# Patient Record
Sex: MALE | Race: WHITE | NOT HISPANIC OR LATINO | Employment: FULL TIME | URBAN - METROPOLITAN AREA
[De-identification: names, ages, dates, MRNs, and addresses within clinical notes are randomized per-mention and may not be internally consistent; named-entity substitution may affect disease eponyms.]

---

## 2017-04-13 ENCOUNTER — HOSPITAL ENCOUNTER (EMERGENCY)
Facility: HOSPITAL | Age: 27
Discharge: HOME | End: 2017-04-13
Attending: EMERGENCY MEDICINE
Payer: COMMERCIAL

## 2017-04-13 VITALS
WEIGHT: 175 LBS | DIASTOLIC BLOOD PRESSURE: 74 MMHG | OXYGEN SATURATION: 97 % | SYSTOLIC BLOOD PRESSURE: 123 MMHG | BODY MASS INDEX: 27.47 KG/M2 | HEIGHT: 67 IN | TEMPERATURE: 99 F | HEART RATE: 108 BPM | RESPIRATION RATE: 16 BRPM

## 2017-04-13 DIAGNOSIS — R05.9 COUGH: Primary | ICD-10-CM

## 2017-04-13 DIAGNOSIS — R50.9 FEVER, UNSPECIFIED FEVER CAUSE: ICD-10-CM

## 2017-04-13 LAB
DEPRECATED S PYO AG THROAT QL EIA: NEGATIVE
FLUAV AG SPEC QL IA: NEGATIVE
FLUBV AG SPEC QL IA: NEGATIVE
SPECIMEN SOURCE: NORMAL

## 2017-04-13 PROCEDURE — 99284 EMERGENCY DEPT VISIT MOD MDM: CPT | Mod: ,,, | Performed by: EMERGENCY MEDICINE

## 2017-04-13 PROCEDURE — 87400 INFLUENZA A/B EACH AG IA: CPT

## 2017-04-13 PROCEDURE — 87081 CULTURE SCREEN ONLY: CPT

## 2017-04-13 PROCEDURE — 87880 STREP A ASSAY W/OPTIC: CPT

## 2017-04-13 PROCEDURE — 99284 EMERGENCY DEPT VISIT MOD MDM: CPT

## 2017-04-13 NOTE — ED AVS SNAPSHOT
[facility]  [address]  [facility] [address]               Sabino Vaughan   2017  9:24 AM   ED    Description:  Male : 1990   Department:  [facility]           Your Care was Coordinated By:     Provider Role From To    Barry Rowley MD Attending Provider 17 0931 --      Reason for Visit     Cough           Diagnoses this Visit        Comments    Cough    -  Primary     Fever, unspecified fever cause           ED Disposition     ED Disposition Condition Comment    Discharge             To Do List           Follow-up Information     Follow up with [facility].    Specialty:  Emergency Medicine    Why:  As needed, If symptoms worsen    Contact information:    [address]  [facility] [address]  [phone]       These Medications        Disp Refills Start End    dextromethorphan-guaifenesin  mg Cap 20 each 0 2017    Take 1 capsule by mouth every 6 (six) hours as needed (Cough). - Oral      [facility]     [facility] -  Assistance  Unless otherwise directed by your provider, please contact [facility], our nurse care line that is available for  assistance.     Registered nurses in the [facility] provide: appointment scheduling, clinical advisement, health education, and other advisory services.  Call: [phone] (toll free)               Medications           START taking these NEW medications        Refills    dextromethorphan-guaifenesin  mg Cap 0    Sig: Take 1 capsule by mouth every 6 (six) hours as needed (Cough).    Class: Print    Route: Oral           Verify that the below list of medications is an accurate representation of the medications you are currently taking.  If none reported, the list may be blank. If incorrect, please contact your healthcare provider. Carry this list with you in case of  "emergency.           Current Medications     ACETAMINOPHEN (TYLENOL EXTRA STRENGTH ORAL) Take by mouth.    dextromethorphan-guaifenesin  mg Cap Take 1 capsule by mouth every 6 (six) hours as needed (Cough).           Clinical Reference Information           Your Vitals Were     BP Pulse Temp Resp Height Weight    123/74 (BP Location: Left arm, Patient Position: Sitting) 108 98.7 °F (37.1 °C) (Oral) 16 5' 7" (1.702 m) 79.4 kg (175 lb)    SpO2 BMI             97% 27.41 kg/m2         Allergies as of 4/13/2017     No Known Allergies      Immunizations Administered on Date of Encounter - 4/13/2017     None      ED Micro, Lab, POCT     Start Ordered       Status Ordering Provider    04/13/17 1015 04/13/17 1014  Rapid strep screen  STAT      Final result     04/13/17 1015 04/13/17 1014  Influenza antigen Nasopharyngeal Swab  STAT      Final result     04/13/17 1014 04/13/17 1014  Strep A culture, throat  Once      In process       ED Imaging Orders     Start Ordered       Status Ordering Provider    04/13/17 1015 04/13/17 1014  X-Ray Chest PA And Lateral  1 time imaging      Final result       Discharge References/Attachments     URI, VIRAL, NO ABX (ADULT) (ENGLISH)    BRONCHITIS, NO ANTIBIOTIC (ADULT) (ENGLISH)      [facility] Sign-Up     Activating your [facility] account is as easy as 1-2-3!     1) Visit [email or web address], select Sign Up Now, enter this activation code and your date of birth, then select Next.  [email or web address]  Expires: 5/28/2017 11:17 AM      2) Create a username and password to use when you visit [facility] in the future and select a security question in case you lose your password and select Next.    3) Enter your e-mail address and click Sign Up!    Additional Information  If you have questions, please e-mail [email or web address] or call [phone] to talk to our [facility] staff. Remember, [facility] is NOT to be used for urgent needs. For medical emergencies, dial 911.          [facility] complies with applicable Federal civil rights laws and does not discriminate on the basis of race, color, national origin, age, disability, or sex.        Language Assistance Services     ATTENTION: Language assistance services are available, free of charge. Please call [phone].      ATENCIÓN: Si habla español, tiene a escobedo disposición servicios gratuitos de asistencia lingüística. Llame al [phone].     CHÚ Ý: N?u b?n nói Ti?ng Vi?t, có các d?ch v? h? tr? ngôn ng? mi?n phí dành cho b?n. G?i s? [phone].

## 2017-04-13 NOTE — ED NOTES
Patient identifiers verified and correct for Mr Vaughan    C/C: cough  APPEARANCE: awake and alert in NAD.  SKIN: warm, dry and intact. No breakdown or bruising.  MUSCULOSKELETAL: Patient moving all extremities spontaneously, no obvious swelling or deformities noted. Ambulates independently.  RESPIRATORY: no shortness of breath. Resp unlabored,   CARDIAC: heart tones normal. Regular rate and rhythm; 2+ distal pulses; no peripheral edema  ABDOMEN: S/ND/NT, Denies nausea.  : voids spontaneously without difficulty.  Neurologic: AAO x 4; follows commands equal strength in all extremities; denies numbness/tingling.

## 2017-04-13 NOTE — ED TRIAGE NOTES
"Patient in ED for cough, sore throat onset last Thurs, has been on Zithromycin x 4 days, completed rx. Yesterday fever to 101. 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   "

## 2017-04-13 NOTE — ED PROVIDER NOTES
Encounter Date: 4/13/2017    SCRIBE #1 NOTE: I, Ivonne Manzo, am scribing for, and in the presence of,  Dr. Rowley. I have scribed the entire note.       History     Chief Complaint   Patient presents with    Cough     Review of patient's allergies indicates:  No Known Allergies  The history is provided by the patient and medical records.     Time seen by provider: 9:59 AM    This is a 26 y.o. male with PM Hx of asthma and DM who presents with complaint of persistent cough for one week. Unable to sleep secondary to symptoms despite taking cough syrup. Pt states that last week he experienced fever, chills, one episode of diarrhea, and sore throat that resolved after a few days. Completed course of antibiotics, 4 days of azithromycin. Wife and son were also sick and son diagnosed with PNA today.   Pt now with cough, fever, and palpitations. Denies current abdominal pain, sore throat, CP, or any other symptoms at this time.       History reviewed. No pertinent past medical history.  History reviewed. No pertinent surgical history.  History reviewed. No pertinent family history.  Social History   Substance Use Topics    Smoking status: Never Smoker    Smokeless tobacco: None    Alcohol use No     Review of Systems   Constitutional: Positive for fever. Negative for chills.   HENT: Negative for sore throat.    Respiratory: Positive for cough (for one week).    Cardiovascular: Positive for palpitations. Negative for chest pain.   Gastrointestinal: Negative for abdominal pain and diarrhea.   All other systems reviewed and are negative.      Physical Exam   Initial Vitals   BP Pulse Resp Temp SpO2   04/13/17 0910 04/13/17 0910 04/13/17 0910 04/13/17 0910 04/13/17 0910   123/74 108 16 98.7 °F (37.1 °C) 97 %     Physical Exam   Gen/Constitutional: Interactive. No acute distress  Head: Normocephalic, Atraumatic  Neck: supple, no masses or LAD  Eyes: PERRLA, conjunctiva clear  Ears, Nose and Throat: No rhinorrhea or  stridor. Mild tonsillar erythema, no exudates, uvula midline.  Cardiac: Reg Rhythm, No murmur  Pulmonary: CTA Bilat, no wheezes, rhonchi, rales. Cough prompted by deep inspiration.   GI: Abdomen soft, non-tender, non-distended; no rebound or guarding  : No CVA tenderness.  Musculoskeletal: Extremities warm, well perfused, no erythema, no edema  Skin: No rashes  Neuro: Alert and Oriented x 3; No focal motor or sensory deficits.    Psych: Normal affect      ED Course   Procedures  Labs Reviewed   THROAT SCREEN, RAPID   CULTURE, STREP A,  THROAT   INFLUENZA A AND B ANTIGEN         Imaging Results         X-Ray Chest PA And Lateral (Final result) Result time:  04/13/17 10:42:41    Final result by Jimmie Donaldson MD (04/13/17 10:42:41)    Impression:        No acute cardiopulmonary disease.      Electronically signed by: JIMMIE DONALDSON MD  Date:     04/13/17  Time:    10:42     Narrative:    History: Cough.    Procedure: Chest 2 views    Findings:    The heart, lungs, mediastinum and pulmonary vasculature are within normal limits.  No pleural effusions.  No bony thorax abnormalities.                   Medical Decision Making:   History:   Old Medical Records: I decided to obtain old medical records.    Clinical Tests:  Labs Test(s) were ordered and reviewed by me.  Radiological study(s) were ordered and reviewed by me.  Medical test(s) were ordered and reviewed by me.    Pt presents with fever, cough. I considered PNA, influenza, strep throat among other diagnoses. Plan to check flu swab, strep throat swab, and CXR. Ultimately felt pt had viral URI with viral bronchitis and would be stable for outpatient management with cough suppressant and continued ibuprofen/tylenol for fever control.     The patient was given strict return precautions and follow up instructions and expressed understanding of these.  The patient felt comfortable with the plan for discharge and I did as well.  Stable for DC.                Scribe  Attestation:   Scribe #1: I performed the above scribed service and the documentation accurately describes the services I performed. I attest to the accuracy of the note.    Attending Attestation:           Physician Attestation for Scribe:  Physician Attestation Statement for Scribe #1: I, Dr. Rowley, reviewed documentation, as scribed by Ivonne Manzo in my presence, and it is both accurate and complete.                 ED Course     Clinical Impression:   The primary encounter diagnosis was Cough. A diagnosis of Fever, unspecified fever cause was also pertinent to this visit.    Disposition:   Disposition: Discharged  Condition: Stable       Barry Rowley MD  04/13/17 5599

## 2017-04-15 LAB — BACTERIA THROAT CULT: NORMAL
